# Patient Record
Sex: MALE | Employment: FULL TIME | URBAN - METROPOLITAN AREA
[De-identification: names, ages, dates, MRNs, and addresses within clinical notes are randomized per-mention and may not be internally consistent; named-entity substitution may affect disease eponyms.]

---

## 2017-02-02 ENCOUNTER — OFFICE VISIT (OUTPATIENT)
Dept: OTOLARYNGOLOGY | Facility: CLINIC | Age: 82
End: 2017-02-02

## 2017-02-02 DIAGNOSIS — R49.0 DYSPHONIA: Primary | ICD-10-CM

## 2017-02-02 NOTE — LETTER
"2/2/2017       RE: Oleg Velasquez  [address]  [facility] [address]     Dear Colleague,    Thank you for referring your patient, Oleg Velasquez, to the [facility] acute hospital. Please see a copy of my visit note below.    [facility]  Fabian Burk Jr., M.D., F.A.C.S.  Katiana Everett M.D., M.P.H.  Richelle Shah, Ph.D., CCC/SLP  Dayna Conway M.M. (voice), M.A., CCC/SLP  Allison Alpers, B.A. (music), M.A., /SLP    [facility]  VOICE/SPEECH/BREATHING THERAPY  RE-EVALUATION AND LARYNGEAL EXAMINATION REPORT    Patient: Oleg Velasquez  Date of Service: 2/2/2017    HISTORY  PATIENT INFORMATION   Eva was seen for re-evaluation and laryngeal examination today.  The most recent evaluation was performed on 8/26/16.  The re-evaluation was deemed appropriate due to a change in status resulting from a recent vocal fold biopsy.    HISTORY OF VOICE/SPEECH/BREATHING DISORDER    Seen in August for laryngeal exam; had been having some voice problems, though was normal at time of visit    Laryngeal exam showed typical mild inflammation and stiffness  o No lesions    PROGRESS SINCE LAST VISIT    Dr. Salguero discovered a new vocal fold lesion in the middle and anterior portion of the right vocal fold    Dr. 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, with some concern over his voice quality    Voice quality is variable    OBJECTIVE FINDINGS  VOICE/ SPEECH/ NON-COMMUNICATIVE LARYNGEAL BEHAVIORS EVALUATION  An evaluation of the voice was accomplished today; salient features are as follows:    Breathing pattern:    Appears within normal limits and adequate     Voice quality is characterized by    More weak than usual, with a strained, damped quality     More breathy than usual    More noisy than usual, but the source of noise appears to be " more related to breathiness and strain than to actual roughness    Habitual pitch was not formally tested, but is judged to be the typical high pitch that he has had in the past    Loudness is reduced    Voice is overall judged to be worse than it has been in the past few exams    LARYNGEAL EXAMINATION    Endoscopic laryngeal examination was performed by:  Richelle Shah, Ph.D., CCC/SLP  Verbal informed consent was obtained and witnessed prior to this procedure.   Type of exam:   Flexible endoscopy with chip-tip technology and stroboscopy, left nostril; topical anesthesia with 3% Lidocaine and 0.25% phenylephrine was applied.  This exam shows:    Laryngeal and Vocal Fold Mucosa    Mucosa of the right vocal fold is markedly enlarged and moderately irregular at the anterior portion  o There are no specific lesions, but overall inflammation    Mucosa of the left vocal fold is WNL, and the same as it has been in the past    The enlargement of the right vocal fold mucosa results in damped overclosure of the anterior glottis, and a large midfold and posterior glottic gap    Neurological and Functional Integrity of the Larynx    Vocal folds are mobile and essentially meet at midline; movement is brisk and symmetric; exam is neurologically normal     Stroboscopy provided the following additional information:    Stroboscopy shows the mucosa is stiff and non-vibratory on the right due to inflammation and irregularity  o The left mucosa can vibrate, but is damped anteriorly by the stiff, enlarged right mucosa    I reviewed this laryngeal exam with Dr. Velasquez today, and I provided pertinent explanations, as well as written information:    Endoscopic findings are consistent with audio-perceptual assessment and patient Hx/complaints.    his symptoms are accounted for by the enlargement, stiffness, and irregularity of the right vocal fold mucosa, that damps vibration of both the right and the anterior left vocal folds, and  creates a posterior glottic gap     IMPRESSIONS/ RECOMMENDATIONS/ PLAN  IMPRESSIONS / RECOMMENDATIONS  Based on today s re-evaluation and laryngeal examination, it would appear that:    DrWojciech Velasquez's voice is worse than it has been in the past few visits    His voice quality is accounted for by enlarged, irregular, stiff right vocal fold mucosa, and the resulting poor vibratory characteristics and glottic gap    Because I was not able to see any post-surgical video of  Eva's larynx, I cannot be sure whether this represents new inflammation or continued healing after his October surgery; I have encouraged Dr. Velasquez to follow-up with Dr. Salguero soon, and to share pictures of today's examination  o I provided still pictures for him, and will provide a video if requested by Dr. Salguero      I will be available for further laryngeal examination as Dr. Salguero or Dr. Velasquez request    Dr. Velasquez and I do not necessarily feel the need for functional therapy now, as his increased dysphonia can be accounted for by his mucosal status; we can reassess if he or Dr. Salguero request    PRIMARY ICD-10 code: R49.0 (Dysphonia)    TOTAL SERVICE TIME: 60 minutes  EVALUATION OF VOICE AND RESONANCE: (40053): 30 minutes    ENDOSCOPIC LARYNGEAL EXAMINATION WITH STROBOSCOPY (40676): 30 minutes  NO CHARGE FACILITY FEE (70260)    Richelle Shah, Ph.D., [facility]-SLP  Speech-Language Pathologist  Director, [facility]  [phone]

## 2017-02-02 NOTE — MR AVS SNAPSHOT
After Visit Summary   2/2/2017    Oleg Velasquez    MRN: [id number]           Patient Information     Date Of Birth          4/28/1934        Visit Information        Provider Department      2/2/2017 9:30 AM Richelle Shah, SLP [facility]        Today's Diagnoses     Dysphonia    -  1       Follow-ups after your visit        Who to contact     Please call your clinic at [phone] to:    Ask questions about your health    Make or cancel appointments    Discuss your medicines    Learn about your test results    Speak to your doctor   If you have compliments or concerns about an experience at your clinic, or if you wish to file a complaint, please contact [facility] Patient Relations at [phone] or email us at [email or web address]         Additional Information About Your Visit        MyChart Information     [employer] gives you secure access to your electronic health record. If you see a primary care provider, you can also send messages to your care team and make appointments. If you have questions, please call your primary care clinic.  If you do not have a primary care provider, please call [phone] and they will assist you.      [employer] is an electronic gateway that provides easy, online access to your medical records. With [employer], you can request a clinic appointment, read your test results, renew a prescription or communicate with your care team.     To access your existing account, please contact your [facility] or call [phone] for assistance.        Care EveryWhere ID     This is your Care EveryWhere ID. This could be used by other organizations to access your [facility] medical records  [id number]         Blood Pressure from Last 3 Encounters:   12/15/14 120/57   09/24/14 117/64   01/24/14 119/60    Weight from Last 3 Encounters:   04/20/15 75.8 kg (167 lb)   12/15/14 76.9 kg (169 lb 9.6 oz)   09/24/14 69.9  kg (154 lb)              We Performed the Following     C BEHAVIORAL & QUALITATIVE ANALYSIS VOICE AND RESONANCE     IMAGESTREAM RECORDING ORDER     LARYNGOSCOPY FLEX/RIGID W STROBOSCOPY        Primary Care Provider Office Phone # Fax #    Guillermo Rosario [phone]       [facility] [address] [address]        Thank you!     Thank you for choosing [facility]  for your care. Our goal is always to provide you with excellent care. Hearing back from our patients is one way we can continue to improve our services. Please take a few minutes to complete the written survey that you may receive in the mail after your visit with us. Thank you!             Your Updated Medication List - Protect others around you: Learn how to safely use, store and throw away your medicines at www.disposemymeds.org.          This list is accurate as of: 2/2/17 11:59 PM.  Always use your most recent med list.                   Brand Name Dispense Instructions for use    aspirin 81 MG tablet      Take  by mouth daily.       ciclopirox 0.77 % cream    LOPROX    30 g    Apply topically 2 times daily to the feet and toenails.       JUBLIA 10 % Soln   Generic drug:  Efinaconazole     4 mL    APPLY TOPICALLY TO AFFECTED TOE NAILS DAILY.       metFORMIN 1000 MG tablet    GLUCOPHAGE     Take 1,000 mg by mouth 2 times daily (with meals).       MULTIVITAMIN & MINERAL PO      Take  by mouth.       omeprazole 40 MG capsule    priLOSEC     Take  by mouth 2 times daily as needed.       ranitidine 150 MG tablet    ZANTAC     Take 150 mg by mouth At Bedtime.

## 2017-03-04 DIAGNOSIS — B35.1 DERMATOPHYTOSIS OF NAIL: ICD-10-CM

## 2017-03-06 RX ORDER — EFINACONAZOLE 100 MG/ML
SOLUTION TOPICAL
Qty: 4 ML | Refills: 11 | Status: SHIPPED | OUTPATIENT
Start: 2017-03-06

## 2019-10-02 ENCOUNTER — HEALTH MAINTENANCE LETTER (OUTPATIENT)
Age: 84
End: 2019-10-02

## 2019-12-16 ENCOUNTER — HEALTH MAINTENANCE LETTER (OUTPATIENT)
Age: 84
End: 2019-12-16

## 2020-08-07 NOTE — PROGRESS NOTES
"[facility]  Fabian Burk Jr., M.D., F.A.C.S.  Katiana Everett M.D., M.P.H.  Richelle Shah, Ph.D., CCC/SLP  Dayna Conway M.M. (voice), M.A., CCC/SLP  Allison Alpers, B.A. (music), M.A., /SLP    [facility]  VOICE/SPEECH/BREATHING THERAPY  RE-EVALUATION AND LARYNGEAL EXAMINATION REPORT    Patient: Oleg Velasquez  Date of Service: 2/2/2017    HISTORY  PATIENT INFORMATION   Eva was seen for re-evaluation and laryngeal examination today.  The most recent evaluation was performed on 8/26/16.  The re-evaluation was deemed appropriate due to a change in status resulting from a recent vocal fold biopsy.    HISTORY OF VOICE/SPEECH/BREATHING DISORDER    Seen in August for laryngeal exam; had been having some voice problems, though was normal at time of visit    Laryngeal exam showed typical mild inflammation and stiffness  o No lesions    PROGRESS SINCE LAST VISIT    Dr. Salguero discovered a new vocal fold lesion in the middle and anterior portion of the right vocal fold    Dr. 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, with some concern over his voice quality    Voice quality is variable    OBJECTIVE FINDINGS  VOICE/ SPEECH/ NON-COMMUNICATIVE LARYNGEAL BEHAVIORS EVALUATION  An evaluation of the voice was accomplished today; salient features are as follows:    Breathing pattern:    Appears within normal limits and adequate     Voice quality is characterized by    More weak than usual, with a strained, damped quality     More breathy than usual    More noisy than usual, but the source of noise appears to be more related to breathiness and strain than to actual roughness    Habitual pitch was not formally tested, but is judged to be the typical high pitch that he has had in the past    Loudness is reduced    Voice is overall judged to be worse than it has been in the past few " August 7, 2020      Carter Fontanez MD  [address] [facility]  [address]  [place] [address]           [place] [facility]  [address] [facility]  [place] [address]  Phone: [phone]  Fax: [phone]          Patient: Shady Snyder   MR Number: [id number]   YOB: 1973   Date of Visit: 8/7/2020       Dear Dr. Carter Fontanez:    Thank you for referring Shady Snyder to me for evaluation. Attached you will find relevant portions of my assessment and plan of care.    If you have questions, please do not hesitate to call me. I look forward to following Shady Snyder along with you.    Sincerely,    Coni Rodriguez MD    Enclosure  CC:  No Recipients    If you would like to receive this communication electronically, please contact [email or web address] or [phone] to request more information on [employer] Link access.    For providers and/or their staff who would like to refer a patient to [facility], please contact us through our one-stop-shop provider referral line, [facility], at [phone].    If you feel you have received this communication in error or would no longer like to receive these types of communications, please e-mail [email or web address]          exams    LARYNGEAL EXAMINATION    Endoscopic laryngeal examination was performed by:  Richelle Shah, Ph.D., CCC/SLP  Verbal informed consent was obtained and witnessed prior to this procedure.   Type of exam:   Flexible endoscopy with chip-tip technology and stroboscopy, left nostril; topical anesthesia with 3% Lidocaine and 0.25% phenylephrine was applied.  This exam shows:    Laryngeal and Vocal Fold Mucosa    Mucosa of the right vocal fold is markedly enlarged and moderately irregular at the anterior portion  o There are no specific lesions, but overall inflammation    Mucosa of the left vocal fold is WNL, and the same as it has been in the past    The enlargement of the right vocal fold mucosa results in damped overclosure of the anterior glottis, and a large midfold and posterior glottic gap    Neurological and Functional Integrity of the Larynx    Vocal folds are mobile and essentially meet at midline; movement is brisk and symmetric; exam is neurologically normal     Stroboscopy provided the following additional information:    Stroboscopy shows the mucosa is stiff and non-vibratory on the right due to inflammation and irregularity  o The left mucosa can vibrate, but is damped anteriorly by the stiff, enlarged right mucosa    I reviewed this laryngeal exam with Dr. Velasquez today, and I provided pertinent explanations, as well as written information:    Endoscopic findings are consistent with audio-perceptual assessment and patient Hx/complaints.    his symptoms are accounted for by the enlargement, stiffness, and irregularity of the right vocal fold mucosa, that damps vibration of both the right and the anterior left vocal folds, and creates a posterior glottic gap     IMPRESSIONS/ RECOMMENDATIONS/ PLAN  IMPRESSIONS / RECOMMENDATIONS  Based on today s re-evaluation and laryngeal examination, it would appear that:    Dr. Velasquez's voice is worse than it has been in the past few visits    His voice quality is  accounted for by enlarged, irregular, stiff right vocal fold mucosa, and the resulting poor vibratory characteristics and glottic gap    Because I was not able to see any post-surgical video of Dr. Velasquez's larynx, I cannot be sure whether this represents new inflammation or continued healing after his October surgery; I have encouraged Dr. Velasquez to follow-up with Dr. Salguero soon, and to share pictures of today's examination  o I provided still pictures for him, and will provide a video if requested by Dr. Salguero      I will be available for further laryngeal examination as Dr. Salguero or Dr. Velasquez request    Dr. Velasquez and I do not necessarily feel the need for functional therapy now, as his increased dysphonia can be accounted for by his mucosal status; we can reassess if he or Dr. Salguero request    RATIONALE: Current level of functioning is:  CJ - At least 20 percent but less than 40 percent impaired, limited, or restricted   based on Dr. Velasquez's extent of dysphonia, extent of impact on function, extent of discomfort, level of effort.    PRIMARY ICD-10 code: R49.0 (Dysphonia)    G-Codes   - Voice functional limitation, current status, at evaluation  CJ - At least 20 percent but less than 40 percent impaired, limited, or restricted     - Voice functional limitation, projected goal status, at reporting interval   CJ - At least 20 percent but less than 40 percent impaired, limited, or restricted     - Voice functional limitation, discharge status, at end of reporting on limitation  CJ - At least 20 percent but less than 40 percent impaired, limited, or restricted    TOTAL SERVICE TIME: 60 minutes  EVALUATION OF VOICE AND RESONANCE: (96787): 30 minutes    ENDOSCOPIC LARYNGEAL EXAMINATION WITH STROBOSCOPY (54811): 30 minutes  NO CHARGE FACILITY FEE (85868)    Richelle Shah, Ph.D., [facility]-SLP  Speech-Language Pathologist  Director, [facility]  [phone]

## 2021-01-15 ENCOUNTER — HEALTH MAINTENANCE LETTER (OUTPATIENT)
Age: 86
End: 2021-01-15

## 2021-09-04 ENCOUNTER — HEALTH MAINTENANCE LETTER (OUTPATIENT)
Age: 86
End: 2021-09-04

## 2022-02-19 ENCOUNTER — HEALTH MAINTENANCE LETTER (OUTPATIENT)
Age: 87
End: 2022-02-19

## 2022-10-22 ENCOUNTER — HEALTH MAINTENANCE LETTER (OUTPATIENT)
Age: 87
End: 2022-10-22

## 2023-11-05 ENCOUNTER — HEALTH MAINTENANCE LETTER (OUTPATIENT)
Age: 88
End: 2023-11-05

## 2025-01-09 ENCOUNTER — OFFICE VISIT (OUTPATIENT)
Dept: AUDIOLOGY | Facility: CLINIC | Age: OVER 89
End: 2025-01-09
Payer: COMMERCIAL

## 2025-01-09 NOTE — PROGRESS NOTES
"AUDIOLOGY REPORT    SUBJECTIVE: Oleg Velasquez is a 89 year old male who was seen at the [facility] [place] on 1/09/25 for audiologic evaluation, referred by, self, and a hearing aid check. They were accompanied today by their wife. 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 The patient reports since his last hearing evaluation his hearing in both ears worsened. Patient has ReSound Linx2 9 hearing aids, but does not wear them consistently. Patient notes he can \"get by without them.\" Denies tinnitus, pain, drainage, fullness, history of noise exposure, and history of ear surgeries. Note: Patient is a retired  from the [employer].    OBJECTIVE:  Abuse Screening:  Do you feel unsafe at home or work/school? No  Do you feel threatened by someone? No  Does anyone try to keep you from having contact with others, or doing things outside of your home? No  Physical signs of abuse present? No       Timed Up and Go Score (in seconds): Not tested  Is patient a fall risk?   Referral initiated: No  Fall Risk Assessment Completed by Audiology      Otoscopic exam indicates ears are clear of cerumen bilaterally     Pure Tone Thresholds assessed using conventional audiometry with good reliability from 250-8000 Hz bilaterally using insert earphones and circumaural headphones.     RIGHT:  mild sloping to profound sensorineural hearing loss with unmeasurable hearing from 6-8 kHz    LEFT:    Mild sloping to severe sensorineural hearing loss     Tympanogram:    RIGHT: normal eardrum mobility    LEFT:   normal eardrum mobility    Reflexes (reported by stimulus ear): 1000 Hz  RIGHT: Ipsilateral is present at normal levels  RIGHT: Contralateral is absent at frequencies tested  LEFT:   Ipsilateral is present at normal levels  LEFT:   Contralateral is " present at normal levels    Speech Reception Threshold:    RIGHT: 45 dB HL    LEFT:   50 dB HL    Word Recognition Score:     RIGHT: 56% at 90 dB HL using NU-6 recorded word list.    LEFT:   52% at 95 dB HL using NU-6 recorded word list.      Hearing Aid Check  Visual inspection of the hearing aids revealed the hearing aids were from a company no longer supported in our clinic. Unable to make changes to volume/programs. Recommended bringing his ReSound Linx2 9 hearing aids for a hearing aid check.      ASSESSMENT: Today's results reveal mild sloping to profound sensorineural hearing loss with unmeasurable hearing from 6-8 kHz in the right ear and mild sloping to severe sensorineural hearing loss in the left ear. Compared to their previous audiogram dated 2/23/2015, hearing has worsened by15-30 dB across most frequencies in both ears. Today s results were discussed with the patient in detail. Unable to make changes to hearing aids since the technology is no longer supported in our system and hearing aids were from a company no longer available in our clinic. Patient will bring ReSound Linx2 9 hearing aids, which he had fit on 3/10/2015, to his next appointment.      PLAN: Oleg is a good hearing aid candidate at this time. The patient was counseled regarding hearing loss and impact on communication. Patient brought in older hearing aids (Axio), which are not supported in our clinic. It is recommended that Oleg return for a hearing aid check next week for his ReSound Linx2 9 hearing aids. Patient can schedule a hearing aid consult after a hearing aid check if he desires newer technology. The patient expressed agreement and understanding of the plan. Please call this clinic with questions regarding these results or recommendations.     THOMAS Ramos.A  Audiology Doctoral Extern  [place] #[id number]    I was present with the patient for the entire Audiology appointment, including all procedures/testing performed by the Gustavo  student, and agree with the student s assessment and plan as documented.     Gustavo Rao, CCC-A  Clinical Audiologist  [place] #[id number]